# Patient Record
Sex: FEMALE | Race: ASIAN | NOT HISPANIC OR LATINO | Employment: UNEMPLOYED | URBAN - METROPOLITAN AREA
[De-identification: names, ages, dates, MRNs, and addresses within clinical notes are randomized per-mention and may not be internally consistent; named-entity substitution may affect disease eponyms.]

---

## 2024-07-08 ENCOUNTER — LAB (OUTPATIENT)
Dept: LAB | Facility: LAB | Age: 39
End: 2024-07-08
Payer: COMMERCIAL

## 2024-07-08 ENCOUNTER — APPOINTMENT (OUTPATIENT)
Dept: PRIMARY CARE | Facility: CLINIC | Age: 39
End: 2024-07-08
Payer: COMMERCIAL

## 2024-07-08 VITALS
SYSTOLIC BLOOD PRESSURE: 122 MMHG | OXYGEN SATURATION: 100 % | HEIGHT: 57 IN | HEART RATE: 61 BPM | WEIGHT: 120 LBS | BODY MASS INDEX: 25.89 KG/M2 | DIASTOLIC BLOOD PRESSURE: 70 MMHG

## 2024-07-08 DIAGNOSIS — Z13.228 SCREENING FOR METABOLIC DISORDER: ICD-10-CM

## 2024-07-08 DIAGNOSIS — R53.83 OTHER FATIGUE: ICD-10-CM

## 2024-07-08 DIAGNOSIS — H54.7 SEEING DIFFICULTY: ICD-10-CM

## 2024-07-08 DIAGNOSIS — R14.0 ABDOMINAL BLOATING: ICD-10-CM

## 2024-07-08 DIAGNOSIS — Z13.220 SCREENING FOR LIPID DISORDERS: ICD-10-CM

## 2024-07-08 DIAGNOSIS — K21.9 GASTROESOPHAGEAL REFLUX DISEASE, UNSPECIFIED WHETHER ESOPHAGITIS PRESENT: ICD-10-CM

## 2024-07-08 DIAGNOSIS — D50.9 IRON DEFICIENCY ANEMIA, UNSPECIFIED IRON DEFICIENCY ANEMIA TYPE: ICD-10-CM

## 2024-07-08 DIAGNOSIS — D50.9 IRON DEFICIENCY ANEMIA, UNSPECIFIED IRON DEFICIENCY ANEMIA TYPE: Primary | ICD-10-CM

## 2024-07-08 DIAGNOSIS — H91.92 HEARING LOSS OF LEFT EAR, UNSPECIFIED HEARING LOSS TYPE: ICD-10-CM

## 2024-07-08 LAB
25(OH)D3 SERPL-MCNC: 19 NG/ML (ref 30–100)
ALBUMIN SERPL BCP-MCNC: 4.1 G/DL (ref 3.4–5)
ALP SERPL-CCNC: 77 U/L (ref 33–110)
ALT SERPL W P-5'-P-CCNC: 38 U/L (ref 7–45)
ANION GAP SERPL CALC-SCNC: 9 MMOL/L (ref 10–20)
AST SERPL W P-5'-P-CCNC: 25 U/L (ref 9–39)
BILIRUB SERPL-MCNC: 0.3 MG/DL (ref 0–1.2)
BUN SERPL-MCNC: 8 MG/DL (ref 6–23)
CALCIUM SERPL-MCNC: 8.9 MG/DL (ref 8.6–10.3)
CHLORIDE SERPL-SCNC: 105 MMOL/L (ref 98–107)
CHOLEST SERPL-MCNC: 139 MG/DL (ref 0–199)
CHOLESTEROL/HDL RATIO: 3.5
CO2 SERPL-SCNC: 28 MMOL/L (ref 21–32)
CREAT SERPL-MCNC: 0.95 MG/DL (ref 0.5–1.05)
EGFRCR SERPLBLD CKD-EPI 2021: 79 ML/MIN/1.73M*2
ERYTHROCYTE [DISTWIDTH] IN BLOOD BY AUTOMATED COUNT: 12.7 % (ref 11.5–14.5)
GLUCOSE SERPL-MCNC: 115 MG/DL (ref 74–99)
HCT VFR BLD AUTO: 38.1 % (ref 36–46)
HDLC SERPL-MCNC: 39.4 MG/DL
HGB BLD-MCNC: 12.4 G/DL (ref 12–16)
IRON SATN MFR SERPL: 11 % (ref 25–45)
IRON SERPL-MCNC: 39 UG/DL (ref 35–150)
MCH RBC QN AUTO: 30.3 PG (ref 26–34)
MCHC RBC AUTO-ENTMCNC: 32.5 G/DL (ref 32–36)
MCV RBC AUTO: 93 FL (ref 80–100)
NON-HDL CHOLESTEROL: 100 MG/DL (ref 0–149)
NRBC BLD-RTO: 0 /100 WBCS (ref 0–0)
PLATELET # BLD AUTO: 246 X10*3/UL (ref 150–450)
POTASSIUM SERPL-SCNC: 4.3 MMOL/L (ref 3.5–5.3)
PROT SERPL-MCNC: 6.6 G/DL (ref 6.4–8.2)
RBC # BLD AUTO: 4.09 X10*6/UL (ref 4–5.2)
SODIUM SERPL-SCNC: 138 MMOL/L (ref 136–145)
TIBC SERPL-MCNC: 343 UG/DL (ref 240–445)
UIBC SERPL-MCNC: 304 UG/DL (ref 110–370)
VIT B12 SERPL-MCNC: 129 PG/ML (ref 211–911)
WBC # BLD AUTO: 8.3 X10*3/UL (ref 4.4–11.3)

## 2024-07-08 PROCEDURE — 83540 ASSAY OF IRON: CPT

## 2024-07-08 PROCEDURE — 82607 VITAMIN B-12: CPT

## 2024-07-08 PROCEDURE — 85027 COMPLETE CBC AUTOMATED: CPT

## 2024-07-08 PROCEDURE — 1036F TOBACCO NON-USER: CPT | Performed by: PHYSICIAN ASSISTANT

## 2024-07-08 PROCEDURE — 36415 COLL VENOUS BLD VENIPUNCTURE: CPT

## 2024-07-08 PROCEDURE — 99215 OFFICE O/P EST HI 40 MIN: CPT | Performed by: PHYSICIAN ASSISTANT

## 2024-07-08 PROCEDURE — 83550 IRON BINDING TEST: CPT

## 2024-07-08 PROCEDURE — 83718 ASSAY OF LIPOPROTEIN: CPT

## 2024-07-08 PROCEDURE — 82306 VITAMIN D 25 HYDROXY: CPT

## 2024-07-08 PROCEDURE — 80053 COMPREHEN METABOLIC PANEL: CPT

## 2024-07-08 PROCEDURE — 82465 ASSAY BLD/SERUM CHOLESTEROL: CPT

## 2024-07-08 NOTE — PROGRESS NOTES
"Subjective   Patient ID: Elizabeth Saxena is a 38 y.o. female who presents for Requesting Referrals.    HPI     Pt presents today with multiple concerns:     C/o left hearing loss. Patient saw ENT (Dr. Reyna) in 2023, CT ordered due to testing confirming a left sided hearing loss, but patient never had it done because of insurance issues and taking care of her mother at that time. Is requesting referral to go back to ENT for re-evaluation.     Also c/o abdominal bloating, acid reflux, abdominal pain. Patient was seen at [facility] ER on 4/5/2024. Rx Pantoprazole which did not help with symptoms States that her Sx are now happening more frequently (around 3-4 times per week). Spicy food always causes her to have heartburn. Does try to avoid spicy food. She does drink coffee - drinks 1-2 coffees per day. Does not use NSAIDs regularly. Was supposed to see GI as a follow up, but did not. Is now requesting referral. Also has diarrhea intermittently. Does not have regular constipation, but will occasionally have a hard time passing stool. Does have a BM daily.    Also c/o difficulty seeing up close. Is requesting referral to eye doctor. States that she saw an eye doctor at [facility] 4-5 years ago and they gave her glasses at that time. She does not still have these glasses, so is unsure if they still work. They got lost in her move from out of [facility] - just moved here around 1.5 years ago.     Also would like orders for routine labs. Is not fasting for labs. Does c/o fatigue. She was previously anemic with iron deficiency, had to get infusion because she could not tolerate oral iron due to constipation. Would like to have this checked as well. The last infusion was around 3-4 years ago.     Review of Systems   All other systems reviewed and are negative.      Objective   /70   Pulse 61   Ht 1.448 m (4' 9\")   Wt 54.4 kg (120 lb)   SpO2 100%   BMI 25.97 kg/m²     Physical Exam  Vitals reviewed.   Constitutional:       " General: She is awake.      Appearance: Normal appearance. She is well-developed.   HENT:      Head: Normocephalic and atraumatic.   Cardiovascular:      Rate and Rhythm: Normal rate.   Pulmonary:      Effort: Pulmonary effort is normal.   Musculoskeletal:      Cervical back: Full passive range of motion without pain.      Right lower leg: No edema.      Left lower leg: No edema.   Skin:     General: Skin is warm and dry.      Findings: No lesion or rash.   Neurological:      General: No focal deficit present.      Mental Status: She is alert and oriented to person, place, and time.      Cranial Nerves: No facial asymmetry.      Motor: Motor function is intact.      Gait: Gait is intact.   Psychiatric:         Attention and Perception: Attention normal.         Mood and Affect: Mood and affect normal.         Assessment/Plan   Diagnoses and all orders for this visit:  Iron deficiency anemia, unspecified iron deficiency anemia type  -     CBC; Future  -     Iron and TIBC; Future  Other fatigue  -     Vitamin D 25-Hydroxy,Total (for eval of Vitamin D levels); Future  -     Vitamin B12; Future  Screening for lipid disorders  -     Lipid Panel Non-Fasting; Future  Screening for metabolic disorder  -     Comprehensive metabolic panel; Future    Wants to stay in [facility] - will refer back to [facility] (Dr. Reyna), [facility] for optometry, and [facility] for gastroenterology.   In the meantime, avoid spicy/acidic foods and caffeine. Pt reports no improvement with PPI medication - benefit does not outweigh the risk of taking medication long-term if not helping.   Labs ordered as above - pt requesting screening labs, follow up on iron deficiency anemia, and also to check on her fatigue and abdominal pain  Will contact pt with results and referral information.

## 2024-07-10 ENCOUNTER — TELEPHONE (OUTPATIENT)
Dept: URGENT CARE | Facility: CLINIC | Age: 39
End: 2024-07-10
Payer: COMMERCIAL

## 2024-07-11 NOTE — TELEPHONE ENCOUNTER
I believe you may have already done this, but just sending this message as a reminder to fax pt's ophthalmology referral to [facility] and GI referral to [facility]. Office note is completed and referrals are ordered in pt's chart.

## 2024-07-24 ENCOUNTER — TELEPHONE (OUTPATIENT)
Dept: URGENT CARE | Facility: CLINIC | Age: 39
End: 2024-07-24
Payer: COMMERCIAL

## 2024-07-24 DIAGNOSIS — E55.9 VITAMIN D DEFICIENCY: ICD-10-CM

## 2024-07-24 DIAGNOSIS — E61.1 IRON DEFICIENCY: ICD-10-CM

## 2024-07-24 DIAGNOSIS — E53.8 VITAMIN B12 DEFICIENCY: Primary | ICD-10-CM

## 2024-07-24 RX ORDER — CHOLECALCIFEROL (VITAMIN D3) 25 MCG
1000 TABLET ORAL DAILY
Qty: 90 TABLET | Refills: 0 | Status: SHIPPED | OUTPATIENT
Start: 2024-07-24 | End: 2024-10-22

## 2024-07-24 RX ORDER — LANOLIN ALCOHOL/MO/W.PET/CERES
1000 CREAM (GRAM) TOPICAL DAILY
Qty: 90 TABLET | Refills: 0 | Status: SHIPPED | OUTPATIENT
Start: 2024-07-24 | End: 2024-10-22

## 2024-07-24 NOTE — TELEPHONE ENCOUNTER
Please call pt and let her know that her labs show that she has Vitamin B12, Vitamin D, and Iron deficiencies. I have called in supplements for her to begin taking for vitamin D and B12. She mentioned needing infusions for iron in the past because she could not tolerate oral iron due to constipation. If she would like, I can refer her to hematology to discuss this, or if she would like to try an iron supplement every other day and use a stool softener and drink plenty of water/eat plenty of fiber, that is an option, too. Just let me know.     We will want to recheck a CBC and B12 in 8 weeks and a vitamin D in 90 days. I have placed orders for these, she can walk in during lab hours to have this done.

## 2024-07-25 NOTE — TELEPHONE ENCOUNTER
Patient notified and expressed understanding. She would like a referral to hematology to discuss her iron infusions.

## 2024-07-27 NOTE — TELEPHONE ENCOUNTER
Referral placed, central scheduling should be in contact with pt to schedule. If she would like to see a hematologist outside of , she should provide us with the name and phone number of the office/provider.

## 2024-08-16 PROBLEM — H91.90 DECREASED HEARING: Status: ACTIVE | Noted: 2024-08-16

## 2024-08-16 PROBLEM — H91.92 HEARING LOSS OF LEFT EAR: Status: ACTIVE | Noted: 2024-08-16

## 2024-08-16 PROBLEM — M67.439 GANGLION OF WRIST: Status: ACTIVE | Noted: 2024-08-16

## 2024-08-16 PROBLEM — H90.0 CONDUCTIVE HEARING LOSS, BILATERAL: Status: ACTIVE | Noted: 2024-08-16

## 2024-08-16 RX ORDER — POLYETHYLENE GLYCOL 400 AND PROPYLENE GLYCOL 4; 3 MG/ML; MG/ML
SOLUTION/ DROPS OPHTHALMIC
COMMUNITY
Start: 2024-07-19

## 2024-08-16 RX ORDER — CARBOXYMETHYLCELLULOSE SODIUM 5 MG/ML
1 SOLUTION/ DROPS OPHTHALMIC 4 TIMES DAILY
COMMUNITY
Start: 2024-07-19

## 2024-08-16 RX ORDER — MINERAL OIL/PETROLATUM,WHITE 20%-80%
OINTMENT (GRAM) OPHTHALMIC (EYE)
COMMUNITY
Start: 2024-07-19

## 2024-09-03 ENCOUNTER — APPOINTMENT (OUTPATIENT)
Dept: OTOLARYNGOLOGY | Facility: CLINIC | Age: 39
End: 2024-09-03
Payer: COMMERCIAL

## 2024-09-03 ENCOUNTER — APPOINTMENT (OUTPATIENT)
Dept: AUDIOLOGY | Facility: CLINIC | Age: 39
End: 2024-09-03
Payer: COMMERCIAL

## 2024-09-03 VITALS — BODY MASS INDEX: 25.89 KG/M2 | WEIGHT: 120 LBS | HEIGHT: 57 IN

## 2024-09-03 DIAGNOSIS — H93.13 TINNITUS OF BOTH EARS: ICD-10-CM

## 2024-09-03 DIAGNOSIS — H90.A32 MIXED CONDUCTIVE AND SENSORINEURAL HEARING LOSS OF LEFT EAR WITH RESTRICTED HEARING OF RIGHT EAR: Primary | ICD-10-CM

## 2024-09-03 DIAGNOSIS — H90.A21 SENSORINEURAL HEARING LOSS (SNHL) OF RIGHT EAR WITH RESTRICTED HEARING OF LEFT EAR: ICD-10-CM

## 2024-09-03 DIAGNOSIS — H91.92 HEARING LOSS OF LEFT EAR, UNSPECIFIED HEARING LOSS TYPE: ICD-10-CM

## 2024-09-03 DIAGNOSIS — H92.02 OTALGIA, LEFT EAR: ICD-10-CM

## 2024-09-03 PROCEDURE — 1036F TOBACCO NON-USER: CPT | Performed by: STUDENT IN AN ORGANIZED HEALTH CARE EDUCATION/TRAINING PROGRAM

## 2024-09-03 PROCEDURE — 99214 OFFICE O/P EST MOD 30 MIN: CPT | Performed by: STUDENT IN AN ORGANIZED HEALTH CARE EDUCATION/TRAINING PROGRAM

## 2024-09-03 PROCEDURE — 92567 TYMPANOMETRY: CPT | Performed by: AUDIOLOGIST

## 2024-09-03 PROCEDURE — 3008F BODY MASS INDEX DOCD: CPT | Performed by: STUDENT IN AN ORGANIZED HEALTH CARE EDUCATION/TRAINING PROGRAM

## 2024-09-03 PROCEDURE — 92557 COMPREHENSIVE HEARING TEST: CPT | Performed by: AUDIOLOGIST

## 2024-09-03 NOTE — PROGRESS NOTES
COMPREHENSIVE AUDIOMETRIC EVALUATION      Name:  Elizabeth Saxena  :  1985  Age:  38 y.o.  Date of Evaluation:  24   Referring Provider:   Barney Verduzco MD       History:  Ms. Saxena was seen today for an evaluation of hearing.  Please recall, patient was previously seen by our clinic, at which time she presented with asymmetric mixed hearing loss.  Today, patient reported left otalgia and no improvement in hearing sensitivity. When asked, patient denied right otalgia.    See audiometric evaluation at end of this report or scanned under media tab    OTOSCOPY:       Right Ear: Clear canal       Left Ear: Clear canal    226 Hz TYMPANOMETRY:       Right Ear: Type A: normal peak pressure, compliance, and ear canal volume, consistent with middle ear function within normal limits       Left Ear: Type A: normal peak pressure, compliance, and ear canal volume, consistent with middle ear function within normal limits    AUDIOMETRIC EVALUATION (Inserts):       Right Ear: Borderline normal to mild, Sensorineural hearing loss                 Left Ear:  Moderately severe, Mixed hearing loss           NOTE: Hearing sensitivity within test retest reliability as compared to most recent audiometric evaluation 2023    Test technique:  Standard Audiometry  Reliability:   good    SPEECH RECOGNITION THRESHOLD:       Right Ear:  25 dBHL in good agreement with PTA       Left Ear:  60 dBHL in good agreement with PTA    WORD RECOGNITION:       Right Ear:  excellent (100%) at elevated presentation level       Left Ear:  excellent (100%) at elevated presentation level    RECOMMENDATIONS:  -Recommend patient return for repeated audiometric evaluation following any medical management for mixed left hearing loss  -Should no medical management be warranted, recommend patient return for hearing aid evaluation.    Donell Plaza, CCC-A     Appt: 9:40 - 10:00 AM

## 2024-09-03 NOTE — PROGRESS NOTES
Assessment  Left mixed hearing loss   Tinnitus     Plan   Audiogram performed today notable for left mixed hearing loss with significant AB gaps and mild sensorineural hearing loss at 250 Hz in her right ear.  Normal middle ear pressures.  The condition was reviewed and explained, possible left otosclerosis, CT IAC ordered to further evaluation.  She we will follow-up with otology for further evaluation.       History of Present Illness  9/3/24  The patient present for follow-up, did not proceed with CT IAC due to insurance/family issues.  Endorses stable hearing loss tinnitus, no other otologic complaints.  Recall 2/6/23  37-year-old female presenting for initial evaluation of hearing loss.  The patient reports developing hearing loss for years, states her hearing loss is more noticeable in her left ear. Occasionally has difficulty understanding speech in high background noise environments.  Also reports bilateral high-frequency tinnitus and occasional right aural fullness.  Denies vertigo, itching, discharge from ear or autophony.   Denies history of prior ear surgery, exposure to ototoxic drugs or agents.    No past medical history on file.    Past Surgical History:   Procedure Laterality Date    APPENDECTOMY           Current Outpatient Medications on File Prior to Visit   Medication Sig Dispense Refill    cholecalciferol (Vitamin D3) 25 MCG (1000 UT) tablet Take 1 tablet (1,000 Units) by mouth once daily. 90 tablet 0    cyanocobalamin (Vitamin B-12) 1,000 mcg tablet Take 1 tablet (1,000 mcg) by mouth once daily. 90 tablet 0    Refresh Tears 0.5 % ophthalmic solution Administer 1 drop into both eyes 4 times a day.      Retaine PM 80-20 % ophthalmic ointment APPLY 1/4 IN STRIP TO BOTH EYE(S) EVERY NIGHT AT BEDTIME      Systane Ultra 0.4-0.3 % drops ophthalmic drops        No current facility-administered medications on file prior to visit.        No Known Allergies     Review of Systems  A detailed 12 point ROS  was performed and is negative except as noted in the intake form, HPI and/or Past Medical History        Physical Exam   CONSTITUTIONAL: Well developed, well nourished.  VOICE: Normal voice quality  RESPIRATION: Breathing comfortably, no stridor.  CV: No clubbing/cyanosis/edema in hands.  EYES: EOM Intact, sclera normal.  NEURO: Alert and oriented times 3, Cranial nerves V,VII intact and symmetric bilaterally.  HEAD AND FACE: Symmetric facial features, no masses or lesions, sinuses nontender to palpation.  SALIVARY GLANDS: Parotid and submandibular glands normal bilaterally.  + EARS: Normal external ears  Right EAC patent, tympanic membrane intact  Left EAC patent, tympanic membrane intact  Malik: L>R  Rinne: Right: A>B; Left A<B.   NOSE: External nose midline, anterior rhinoscopy is normal with limited visualization to the anterior aspect of the interior turbinates. No lesions noted.  ORAL CAVITY/OROPHARYNX/LIPS: Normal mucous membranes, normal floor of mouth/tongue/OP, no masses or lesions are noted.  PHARYNGEAL WALLS AND NASOPHARYNX: No masses noted. Mucosa appears clean and moist  NECK/LYMPH: No LAD, no thyroid masses. Trachea palpably midline  SKIN: Neck skin is without injury  PSYCH: Alert and oriented with appropriate mood and affect     Results:   Audiogram performed today personally reviewed  Right: Mild sensorineural hearing loss at 250 Hz, otherwise normal hearing.  Speech discrimination score 100%.  Type a tympanogram.  Left: Moderate mixed hearing loss with significant AB gaps.  Speech discrimination score 100%.  Type a tympanogram.

## 2024-09-17 ENCOUNTER — HOSPITAL ENCOUNTER (OUTPATIENT)
Dept: RADIOLOGY | Facility: CLINIC | Age: 39
Discharge: HOME | End: 2024-09-17
Payer: COMMERCIAL

## 2024-09-17 DIAGNOSIS — H90.A32 MIXED CONDUCTIVE AND SENSORINEURAL HEARING LOSS OF LEFT EAR WITH RESTRICTED HEARING OF RIGHT EAR: ICD-10-CM

## 2024-09-17 PROCEDURE — 70480 CT ORBIT/EAR/FOSSA W/O DYE: CPT

## 2024-09-17 PROCEDURE — 70480 CT ORBIT/EAR/FOSSA W/O DYE: CPT | Performed by: RADIOLOGY

## 2024-10-01 ENCOUNTER — TELEPHONE (OUTPATIENT)
Dept: HEMATOLOGY/ONCOLOGY | Facility: HOSPITAL | Age: 39
End: 2024-10-01

## 2024-10-01 NOTE — TELEPHONE ENCOUNTER
Attempted to reach patient regarding her appointment that was scheduled today 10/01 @ 12:30 with Dr. Wagner. Patient no showed her appointment. Left message for patient with call back number to call at her earliest convenience.

## 2024-10-09 NOTE — TELEPHONE ENCOUNTER
Spoke with the patient she would like to talk to her PCP and find a provider closer to home. Patient declined to reschedule with us.

## 2024-10-23 ENCOUNTER — APPOINTMENT (OUTPATIENT)
Dept: OTOLARYNGOLOGY | Facility: CLINIC | Age: 39
End: 2024-10-23
Payer: COMMERCIAL

## 2024-10-23 ENCOUNTER — CLINICAL SUPPORT (OUTPATIENT)
Dept: AUDIOLOGY | Facility: CLINIC | Age: 39
End: 2024-10-23
Payer: COMMERCIAL

## 2024-10-23 VITALS — WEIGHT: 120 LBS | BODY MASS INDEX: 25.89 KG/M2 | HEIGHT: 57 IN

## 2024-10-23 DIAGNOSIS — H80.92 OTOSCLEROSIS OF LEFT EAR: Primary | ICD-10-CM

## 2024-10-23 DIAGNOSIS — H90.A32 MIXED CONDUCTIVE AND SENSORINEURAL HEARING LOSS OF LEFT EAR WITH RESTRICTED HEARING OF RIGHT EAR: ICD-10-CM

## 2024-10-23 DIAGNOSIS — H90.A21 SENSORINEURAL HEARING LOSS (SNHL) OF RIGHT EAR WITH RESTRICTED HEARING OF LEFT EAR: ICD-10-CM

## 2024-10-23 DIAGNOSIS — H90.A32 MIXED CONDUCTIVE AND SENSORINEURAL HEARING LOSS OF LEFT EAR WITH RESTRICTED HEARING OF RIGHT EAR: Primary | ICD-10-CM

## 2024-10-23 PROCEDURE — 99204 OFFICE O/P NEW MOD 45 MIN: CPT | Performed by: OTOLARYNGOLOGY

## 2024-10-23 PROCEDURE — 3008F BODY MASS INDEX DOCD: CPT | Performed by: OTOLARYNGOLOGY

## 2024-10-23 PROCEDURE — 92550 TYMPANOMETRY & REFLEX THRESH: CPT | Performed by: AUDIOLOGIST

## 2024-10-23 RX ORDER — CEFAZOLIN SODIUM 2 G/100ML
2 INJECTION, SOLUTION INTRAVENOUS ONCE
OUTPATIENT
Start: 2024-10-23 | End: 2024-10-23

## 2024-10-23 NOTE — PROGRESS NOTES
"        Reason for Consult:  Hearing Loss     Subjective   History Of Present Illness:  Elizabeth Saxena is a 38 y.o. female with left-sided hearing changes that has been present for the last 3 years. She is not able to hear when people whisper in her ear. She denies a family history of hearing loss. She denies tinnitus or dizziness.      Past Medical History:  She has no past medical history on file.    Surgical History:  She has a past surgical history that includes Appendectomy.     Social History:  She reports that she has never smoked. She has never been exposed to tobacco smoke. She has never used smokeless tobacco. She reports that she does not currently use alcohol. She reports that she does not currently use drugs.    Family History:  family history is not on file.     Medications:  Current Outpatient Medications   Medication Instructions    Refresh Tears 0.5 % ophthalmic solution 1 drop, 4 times daily    Retaine PM 80-20 % ophthalmic ointment APPLY 1/4 IN STRIP TO BOTH EYE(S) EVERY NIGHT AT BEDTIME    Systane Ultra 0.4-0.3 % drops ophthalmic drops       Allergies:  Patient has no known allergies.    Review of Systems:   A comprehensive 10-point review of systems was obtained including constitutional, neurological, HEENT, pulmonary, cardiovascular, genito-urinary, and other pertinent systems and was negative except as noted in the HPI.     Objective   Physical Exam:  Last Recorded Vitals: Height 1.448 m (4' 9\"), weight 54.4 kg (120 lb).    On physical exam, the patient is a well-nourished, well-developed patient, in no acute distress, able to communicate without assistance in English language. Head and face is atraumatic and normocephalic. Salivary glands are intact. Facial strength is symmetrical bilaterally.       On ear examination:  Right ear: The patient has an open and patent ear canal. The tympanic membrane is intact.  AC>BC  Left ear: The patient has an open and patent ear canal. The tympanic membrane " is intact.  BC>AC  The [place] is left    On vestibular exam, the patient has no spontaneous nystagmus, no headshake nystagmus, no head-thrust nystagmus, and no nystagmus on hyperventilation or Valsalva maneuvers. [place]-Hallpike maneuver is negative bilaterally.       On neuro exam, the patient is alert and oriented x3, cranial nerves are grossly intact, cerebellar exam is normal.      The rest of the exam, including anterior rhinoscopy, oropharyngeal exam, neck exam, and cardiovascular exam, were normal including no palpable lymphadenopathies, thyroid in the midline position, normal pulses, and normal chest excursion.       Reviewed Results:  Audiology Testing:  I personally reviewed the audiogram from 09/2024 that showed: normal hearing on the right with 100% discrimination. Moderate mixed hearing loss on the left with 40 dB of ABG and a 100% discrimination.       I reviewed acoustic reflexes from 10/2024 that shows absent reflexes bilaterally    Imaging:  I reviewed her CT IAC from 09/2024 that shows heterogeneity of the fissula ante fenestra bilaterally.        Procedure:  None    Assessment/Plan     1. Otosclerosis of left ear    2. 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 female with left-sided moderate mixed hearing loss with absent acoustic reflexes. This is consistent with otosclerosis. Her CT IAC showed otosclerosis bilaterally.    We discussed hearing aids vs surgery. She elected to undergo surgery.     The risks, indications, and complications of a left-sided endoscopic stapedectomy were discussed with the patient. She elected to proceed and will be scheduled for this in the near future.        Scribe Attestation  By signing my name below, IRaysa , Scribe attest that this documentation has been prepared under the direction and in the presence of Dale Javed MD.     ____________________________________________________  Dale Cordon MD  Professor and Chief   Otology/Neurotology/Lateral Skull-Base Surgery   [facility]

## 2024-10-23 NOTE — LETTER
October 26, 2024     Barney Verduzco MD  [address] [facility] [address]    Patient: Elizabeth Saxena   YOB: 1985   Date of Visit: 10/23/2024       Dear Dr. Barney Verduzco MD:    Thank you for referring Elizabeth Saxena to me for evaluation. Below are my notes for this consultation.  If you have questions, please do not hesitate to call me. I look forward to following your patient along with you.       Sincerely,     Dale Javed MD      CC: Oniel Robins, DO  ______________________________________________________________________________________            Reason for Consult:  Hearing Loss     Subjective  History Of Present Illness:  Elizabeth Saxena is a 38 y.o. female with left-sided hearing changes that has been present for the last 3 years. She is not able to hear when people whisper in her ear. She denies a family history of hearing loss. She denies tinnitus or dizziness.      Past Medical History:  She has no past medical history on file.    Surgical History:  She has a past surgical history that includes Appendectomy.     Social History:  She reports that she has never smoked. She has never been exposed to tobacco smoke. She has never used smokeless tobacco. She reports that she does not currently use alcohol. She reports that she does not currently use drugs.    Family History:  family history is not on file.     Medications:  Current Outpatient Medications   Medication Instructions   • Refresh Tears 0.5 % ophthalmic solution 1 drop, 4 times daily   • Retaine PM 80-20 % ophthalmic ointment APPLY 1/4 IN STRIP TO BOTH EYE(S) EVERY NIGHT AT BEDTIME   • Systane Ultra 0.4-0.3 % drops ophthalmic drops       Allergies:  Patient has no known allergies.    Review of Systems:   A comprehensive 10-point review of systems was obtained including constitutional, neurological, HEENT, pulmonary, cardiovascular, genito-urinary, and other pertinent systems and was negative except as noted in the  "HPI.     Objective  Physical Exam:  Last Recorded Vitals: Height 1.448 m (4' 9\"), weight 54.4 kg (120 lb).    On physical exam, the patient is a well-nourished, well-developed patient, in no acute distress, able to communicate without assistance in English language. Head and face is atraumatic and normocephalic. Salivary glands are intact. Facial strength is symmetrical bilaterally.       On ear examination:  Right ear: The patient has an open and patent ear canal. The tympanic membrane is intact.  AC>BC  Left ear: The patient has an open and patent ear canal. The tympanic membrane is intact.  BC>AC  The Pinto is left    On vestibular exam, the patient has no spontaneous nystagmus, no headshake nystagmus, no head-thrust nystagmus, and no nystagmus on hyperventilation or Valsalva maneuvers. [place]-Hallpike maneuver is negative bilaterally.       On neuro exam, the patient is alert and oriented x3, cranial nerves are grossly intact, cerebellar exam is normal.      The rest of the exam, including anterior rhinoscopy, oropharyngeal exam, neck exam, and cardiovascular exam, were normal including no palpable lymphadenopathies, thyroid in the midline position, normal pulses, and normal chest excursion.       Reviewed Results:  Audiology Testing:  I personally reviewed the audiogram from 09/2024 that showed: normal hearing on the right with 100% discrimination. Moderate mixed hearing loss on the left with 40 dB of ABG and a 100% discrimination.       I reviewed acoustic reflexes from 10/2024 that shows absent reflexes bilaterally    Imaging:  I reviewed her CT IAC from 09/2024 that shows heterogeneity of the fissula ante fenestra bilaterally.        Procedure:  None    Assessment/Plan    1. Otosclerosis of left ear    2. 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 female with left-sided moderate mixed hearing loss with absent " acoustic reflexes. This is consistent with otosclerosis. Her CT IAC showed otosclerosis bilaterally.    We discussed hearing aids vs surgery. She elected to undergo surgery.     The risks, indications, and complications of a left-sided endoscopic stapedectomy were discussed with the patient. She elected to proceed and will be scheduled for this in the near future.        Scribe Attestation  By signing my name below, I, Raysa Adelso , Scribe attest that this documentation has been prepared under the direction and in the presence of Dale Javed MD.    ____________________________________________________  Dale Cordon MD  Professor and Chief   Otology/Neurotology/Lateral Skull-Base Surgery   [facility]

## 2024-10-23 NOTE — PROGRESS NOTES
ACOUSTIC REFLEXES      Name:  Elizabeth Saxena  :  1985  Age:  38 y.o.  Date of Evaluation:  10/23/24   Referring Provider:   Dale Javed MD       See audiometric evaluation at end of this report or scanned under media tab    OTOSCOPY:       Right Ear: Clear canal       Left Ear: Clear canal    IPSILATERAL ACOUSTIC REFLEXES:       Right Ear:  Absent 500 - 2000 Hz       Left Ear:    Absent 500 - 2000 Hz    CONTRALATERAL REFLEXES:       Probe Right (Stimulus Left):  Absent 500 - 2000 Hz       Probe Left (Stimulus Right):    Absent 500 - 2000 Hz    NOTE: Absent reflexes in the right ear is a surprising finding given patient's hearing sensitivity.  Absent reflexes in the left ear are expected given conductive component and thresholds of >60 dB HL    RECOMMENDATIONS:  -Recommend patient return should concerns for changes in hearing sensitivity arise or as medically indicated.     Donell Plaza, CCC-A     Appt: 1:30 - 1:40 PM

## 2024-10-28 ENCOUNTER — ANESTHESIA EVENT (OUTPATIENT)
Dept: OPERATING ROOM | Facility: CLINIC | Age: 39
End: 2024-10-28
Payer: COMMERCIAL

## 2024-10-29 ENCOUNTER — ANESTHESIA (OUTPATIENT)
Dept: OPERATING ROOM | Facility: CLINIC | Age: 39
End: 2024-10-29
Payer: COMMERCIAL

## 2024-10-29 ENCOUNTER — HOSPITAL ENCOUNTER (OUTPATIENT)
Facility: CLINIC | Age: 39
Setting detail: OUTPATIENT SURGERY
Discharge: HOME | End: 2024-10-29
Attending: OTOLARYNGOLOGY | Admitting: OTOLARYNGOLOGY
Payer: COMMERCIAL

## 2024-10-29 VITALS
HEIGHT: 57 IN | RESPIRATION RATE: 16 BRPM | SYSTOLIC BLOOD PRESSURE: 127 MMHG | WEIGHT: 122.14 LBS | BODY MASS INDEX: 26.35 KG/M2 | DIASTOLIC BLOOD PRESSURE: 82 MMHG | OXYGEN SATURATION: 99 % | TEMPERATURE: 97.2 F | HEART RATE: 55 BPM

## 2024-10-29 DIAGNOSIS — H80.92 OTOSCLEROSIS OF LEFT EAR: Primary | ICD-10-CM

## 2024-10-29 DIAGNOSIS — H90.A32 MIXED CONDUCTIVE AND SENSORINEURAL HEARING LOSS OF LEFT EAR WITH RESTRICTED HEARING OF RIGHT EAR: ICD-10-CM

## 2024-10-29 LAB — PREGNANCY TEST URINE, POC: NEGATIVE

## 2024-10-29 PROCEDURE — 2500000002 HC RX 250 W HCPCS SELF ADMINISTERED DRUGS (ALT 637 FOR MEDICARE OP, ALT 636 FOR OP/ED): Mod: SE | Performed by: OTOLARYNGOLOGY

## 2024-10-29 PROCEDURE — 7100000002 HC RECOVERY ROOM TIME - EACH INCREMENTAL 1 MINUTE: Performed by: OTOLARYNGOLOGY

## 2024-10-29 PROCEDURE — 7100000001 HC RECOVERY ROOM TIME - INITIAL BASE CHARGE: Performed by: OTOLARYNGOLOGY

## 2024-10-29 PROCEDURE — 2500000004 HC RX 250 GENERAL PHARMACY W/ HCPCS (ALT 636 FOR OP/ED): Mod: SE

## 2024-10-29 PROCEDURE — 2500000005 HC RX 250 GENERAL PHARMACY W/O HCPCS: Mod: SE | Performed by: OTOLARYNGOLOGY

## 2024-10-29 PROCEDURE — 2780000003 HC OR 278 NO HCPCS: Performed by: OTOLARYNGOLOGY

## 2024-10-29 PROCEDURE — 3700000002 HC GENERAL ANESTHESIA TIME - EACH INCREMENTAL 1 MINUTE: Performed by: OTOLARYNGOLOGY

## 2024-10-29 PROCEDURE — L8613 OSSICULAR IMPLANT: HCPCS | Performed by: OTOLARYNGOLOGY

## 2024-10-29 PROCEDURE — 2500000001 HC RX 250 WO HCPCS SELF ADMINISTERED DRUGS (ALT 637 FOR MEDICARE OP): Mod: SE | Performed by: OTOLARYNGOLOGY

## 2024-10-29 PROCEDURE — 7100000010 HC PHASE TWO TIME - EACH INCREMENTAL 1 MINUTE: Performed by: OTOLARYNGOLOGY

## 2024-10-29 PROCEDURE — 69661 REVISE MIDDLE EAR BONE: CPT | Performed by: OTOLARYNGOLOGY

## 2024-10-29 PROCEDURE — A69661 PR STAPEDECTOMY,FOOTPLATE DRILL OUT: Performed by: ANESTHESIOLOGY

## 2024-10-29 PROCEDURE — 95867 NDL EMG CRANIAL NRV MUSC UNI: CPT | Performed by: OTOLARYNGOLOGY

## 2024-10-29 PROCEDURE — 81025 URINE PREGNANCY TEST: CPT | Performed by: OTOLARYNGOLOGY

## 2024-10-29 PROCEDURE — 2500000001 HC RX 250 WO HCPCS SELF ADMINISTERED DRUGS (ALT 637 FOR MEDICARE OP): Mod: SE | Performed by: ANESTHESIOLOGY

## 2024-10-29 PROCEDURE — 2500000001 HC RX 250 WO HCPCS SELF ADMINISTERED DRUGS (ALT 637 FOR MEDICARE OP): Mod: SE

## 2024-10-29 PROCEDURE — 2500000004 HC RX 250 GENERAL PHARMACY W/ HCPCS (ALT 636 FOR OP/ED): Mod: SE | Performed by: OTOLARYNGOLOGY

## 2024-10-29 PROCEDURE — 2720000007 HC OR 272 NO HCPCS: Performed by: OTOLARYNGOLOGY

## 2024-10-29 PROCEDURE — 2500000004 HC RX 250 GENERAL PHARMACY W/ HCPCS (ALT 636 FOR OP/ED): Mod: SE | Performed by: ANESTHESIOLOGY

## 2024-10-29 PROCEDURE — 3600000008 HC OR TIME - EACH INCREMENTAL 1 MINUTE - PROCEDURE LEVEL THREE: Performed by: OTOLARYNGOLOGY

## 2024-10-29 PROCEDURE — A69661 PR STAPEDECTOMY,FOOTPLATE DRILL OUT

## 2024-10-29 PROCEDURE — 3700000001 HC GENERAL ANESTHESIA TIME - INITIAL BASE CHARGE: Performed by: OTOLARYNGOLOGY

## 2024-10-29 PROCEDURE — 7100000009 HC PHASE TWO TIME - INITIAL BASE CHARGE: Performed by: OTOLARYNGOLOGY

## 2024-10-29 PROCEDURE — 3600000003 HC OR TIME - INITIAL BASE CHARGE - PROCEDURE LEVEL THREE: Performed by: OTOLARYNGOLOGY

## 2024-10-29 DEVICE — ECLIPSE PISTON 0.6MM DIA X 4.50MM L NITINOL/FLUOROPLASTIC
Type: IMPLANTABLE DEVICE | Site: EAR | Status: FUNCTIONAL
Brand: ECLIPSE PISTON

## 2024-10-29 RX ORDER — FENTANYL CITRATE 50 UG/ML
INJECTION, SOLUTION INTRAMUSCULAR; INTRAVENOUS AS NEEDED
Status: DISCONTINUED | OUTPATIENT
Start: 2024-10-29 | End: 2024-10-29

## 2024-10-29 RX ORDER — CIPROFLOXACIN AND DEXAMETHASONE 3; 1 MG/ML; MG/ML
5 SUSPENSION/ DROPS AURICULAR (OTIC) DAILY
Qty: 7.5 ML | Refills: 1 | Status: SHIPPED | OUTPATIENT
Start: 2024-10-29

## 2024-10-29 RX ORDER — CIPROFLOXACIN AND DEXAMETHASONE 3; 1 MG/ML; MG/ML
SUSPENSION/ DROPS AURICULAR (OTIC) AS NEEDED
Status: DISCONTINUED | OUTPATIENT
Start: 2024-10-29 | End: 2024-10-29 | Stop reason: HOSPADM

## 2024-10-29 RX ORDER — ONDANSETRON HYDROCHLORIDE 2 MG/ML
INJECTION, SOLUTION INTRAVENOUS AS NEEDED
Status: DISCONTINUED | OUTPATIENT
Start: 2024-10-29 | End: 2024-10-29

## 2024-10-29 RX ORDER — LABETALOL HYDROCHLORIDE 5 MG/ML
5 INJECTION, SOLUTION INTRAVENOUS ONCE AS NEEDED
Status: DISCONTINUED | OUTPATIENT
Start: 2024-10-29 | End: 2024-10-29 | Stop reason: HOSPADM

## 2024-10-29 RX ORDER — CEFAZOLIN 1 G/1
INJECTION, POWDER, FOR SOLUTION INTRAVENOUS AS NEEDED
Status: DISCONTINUED | OUTPATIENT
Start: 2024-10-29 | End: 2024-10-29

## 2024-10-29 RX ORDER — MUPIROCIN 20 MG/G
OINTMENT TOPICAL AS NEEDED
Status: DISCONTINUED | OUTPATIENT
Start: 2024-10-29 | End: 2024-10-29 | Stop reason: HOSPADM

## 2024-10-29 RX ORDER — ASPIRIN 81 MG
100 TABLET, DELAYED RELEASE (ENTERIC COATED) ORAL 2 TIMES DAILY
Start: 2024-10-29

## 2024-10-29 RX ORDER — SODIUM CHLORIDE, SODIUM LACTATE, POTASSIUM CHLORIDE, CALCIUM CHLORIDE 600; 310; 30; 20 MG/100ML; MG/100ML; MG/100ML; MG/100ML
INJECTION, SOLUTION INTRAVENOUS CONTINUOUS PRN
Status: DISCONTINUED | OUTPATIENT
Start: 2024-10-29 | End: 2024-10-29

## 2024-10-29 RX ORDER — OXYCODONE HYDROCHLORIDE 5 MG/1
5 TABLET ORAL EVERY 4 HOURS PRN
Status: DISCONTINUED | OUTPATIENT
Start: 2024-10-29 | End: 2024-10-29 | Stop reason: HOSPADM

## 2024-10-29 RX ORDER — METHYLPREDNISOLONE 4 MG/1
TABLET ORAL
Qty: 21 TABLET | Refills: 0 | Status: SHIPPED | OUTPATIENT
Start: 2024-10-29 | End: 2024-11-05

## 2024-10-29 RX ORDER — LIDOCAINE HYDROCHLORIDE AND EPINEPHRINE 20; 10 MG/ML; UG/ML
INJECTION, SOLUTION INFILTRATION; PERINEURAL AS NEEDED
Status: DISCONTINUED | OUTPATIENT
Start: 2024-10-29 | End: 2024-10-29 | Stop reason: HOSPADM

## 2024-10-29 RX ORDER — PHENYLEPHRINE HCL IN 0.9% NACL 1 MG/10 ML
SYRINGE (ML) INTRAVENOUS AS NEEDED
Status: DISCONTINUED | OUTPATIENT
Start: 2024-10-29 | End: 2024-10-29

## 2024-10-29 RX ORDER — MIDAZOLAM HYDROCHLORIDE 1 MG/ML
INJECTION, SOLUTION INTRAMUSCULAR; INTRAVENOUS AS NEEDED
Status: DISCONTINUED | OUTPATIENT
Start: 2024-10-29 | End: 2024-10-29

## 2024-10-29 RX ORDER — LIDOCAINE HYDROCHLORIDE 10 MG/ML
0.1 INJECTION, SOLUTION EPIDURAL; INFILTRATION; INTRACAUDAL; PERINEURAL ONCE
Status: DISCONTINUED | OUTPATIENT
Start: 2024-10-29 | End: 2024-10-29 | Stop reason: HOSPADM

## 2024-10-29 RX ORDER — LIDOCAINE HYDROCHLORIDE 40 MG/ML
SOLUTION TOPICAL AS NEEDED
Status: DISCONTINUED | OUTPATIENT
Start: 2024-10-29 | End: 2024-10-29

## 2024-10-29 RX ORDER — ONDANSETRON HYDROCHLORIDE 2 MG/ML
4 INJECTION, SOLUTION INTRAVENOUS ONCE AS NEEDED
Status: DISCONTINUED | OUTPATIENT
Start: 2024-10-29 | End: 2024-10-29 | Stop reason: HOSPADM

## 2024-10-29 RX ORDER — TRAMADOL HYDROCHLORIDE 50 MG/1
50 TABLET ORAL EVERY 4 HOURS PRN
Qty: 12 TABLET | Refills: 0 | Status: SHIPPED | OUTPATIENT
Start: 2024-10-29

## 2024-10-29 RX ORDER — CEFAZOLIN SODIUM 2 G/50ML
2 SOLUTION INTRAVENOUS ONCE
Status: DISCONTINUED | OUTPATIENT
Start: 2024-10-29 | End: 2024-10-29 | Stop reason: HOSPADM

## 2024-10-29 RX ORDER — ACETAMINOPHEN 325 MG/1
650 TABLET ORAL EVERY 6 HOURS PRN
Start: 2024-10-29

## 2024-10-29 RX ORDER — SODIUM CHLORIDE 0.9 G/100ML
IRRIGANT IRRIGATION AS NEEDED
Status: DISCONTINUED | OUTPATIENT
Start: 2024-10-29 | End: 2024-10-29 | Stop reason: HOSPADM

## 2024-10-29 RX ORDER — ONDANSETRON 4 MG/1
4 TABLET, FILM COATED ORAL EVERY 8 HOURS PRN
Qty: 20 TABLET | Refills: 0 | Status: SHIPPED | OUTPATIENT
Start: 2024-10-29

## 2024-10-29 RX ORDER — ACETAMINOPHEN 325 MG/1
650 TABLET ORAL EVERY 4 HOURS PRN
Status: DISCONTINUED | OUTPATIENT
Start: 2024-10-29 | End: 2024-10-29 | Stop reason: HOSPADM

## 2024-10-29 RX ORDER — EPINEPHRINE 1 MG/ML
INJECTION, SOLUTION, CONCENTRATE INTRAVENOUS AS NEEDED
Status: DISCONTINUED | OUTPATIENT
Start: 2024-10-29 | End: 2024-10-29 | Stop reason: HOSPADM

## 2024-10-29 RX ORDER — IBUPROFEN 600 MG/1
600 TABLET ORAL EVERY 6 HOURS PRN
Start: 2024-10-29

## 2024-10-29 RX ORDER — CEPHALEXIN 500 MG/1
500 CAPSULE ORAL 3 TIMES DAILY
Qty: 15 CAPSULE | Refills: 0 | Status: SHIPPED | OUTPATIENT
Start: 2024-10-29 | End: 2024-11-03

## 2024-10-29 RX ORDER — FENTANYL CITRATE 50 UG/ML
25 INJECTION, SOLUTION INTRAMUSCULAR; INTRAVENOUS EVERY 5 MIN PRN
Status: DISCONTINUED | OUTPATIENT
Start: 2024-10-29 | End: 2024-10-29 | Stop reason: HOSPADM

## 2024-10-29 RX ORDER — FENTANYL CITRATE 50 UG/ML
50 INJECTION, SOLUTION INTRAMUSCULAR; INTRAVENOUS EVERY 5 MIN PRN
Status: DISCONTINUED | OUTPATIENT
Start: 2024-10-29 | End: 2024-10-29 | Stop reason: HOSPADM

## 2024-10-29 RX ORDER — PROPOFOL 10 MG/ML
INJECTION, EMULSION INTRAVENOUS AS NEEDED
Status: DISCONTINUED | OUTPATIENT
Start: 2024-10-29 | End: 2024-10-29

## 2024-10-29 SDOH — HEALTH STABILITY: MENTAL HEALTH: CURRENT SMOKER: 0

## 2024-10-29 ASSESSMENT — COLUMBIA-SUICIDE SEVERITY RATING SCALE - C-SSRS
1. IN THE PAST MONTH, HAVE YOU WISHED YOU WERE DEAD OR WISHED YOU COULD GO TO SLEEP AND NOT WAKE UP?: NO
2. HAVE YOU ACTUALLY HAD ANY THOUGHTS OF KILLING YOURSELF?: NO
6. HAVE YOU EVER DONE ANYTHING, STARTED TO DO ANYTHING, OR PREPARED TO DO ANYTHING TO END YOUR LIFE?: NO

## 2024-10-29 ASSESSMENT — PAIN SCALES - GENERAL
PAINLEVEL_OUTOF10: 7
PAIN_LEVEL: 1
PAINLEVEL_OUTOF10: 5 - MODERATE PAIN
PAINLEVEL_OUTOF10: 0 - NO PAIN
PAINLEVEL_OUTOF10: 5 - MODERATE PAIN

## 2024-10-29 ASSESSMENT — PAIN - FUNCTIONAL ASSESSMENT
PAIN_FUNCTIONAL_ASSESSMENT: 0-10
PAIN_FUNCTIONAL_ASSESSMENT: UNABLE TO SELF-REPORT
PAIN_FUNCTIONAL_ASSESSMENT: 0-10

## 2024-10-29 ASSESSMENT — PAIN DESCRIPTION - ORIENTATION: ORIENTATION: LEFT

## 2024-10-29 ASSESSMENT — PAIN DESCRIPTION - LOCATION: LOCATION: EAR

## 2024-10-31 ENCOUNTER — APPOINTMENT (OUTPATIENT)
Dept: GASTROENTEROLOGY | Facility: CLINIC | Age: 39
End: 2024-10-31
Payer: COMMERCIAL

## 2024-11-26 ENCOUNTER — APPOINTMENT (OUTPATIENT)
Dept: OTOLARYNGOLOGY | Facility: CLINIC | Age: 39
End: 2024-11-26
Payer: COMMERCIAL

## 2024-11-26 VITALS — HEIGHT: 57 IN | BODY MASS INDEX: 25.89 KG/M2 | WEIGHT: 120 LBS

## 2024-11-26 DIAGNOSIS — H80.92 OTOSCLEROSIS OF LEFT EAR: ICD-10-CM

## 2024-11-26 DIAGNOSIS — H90.A32 MIXED CONDUCTIVE AND SENSORINEURAL HEARING LOSS OF LEFT EAR WITH RESTRICTED HEARING OF RIGHT EAR: Primary | ICD-10-CM

## 2024-11-26 DIAGNOSIS — H90.A21 SENSORINEURAL HEARING LOSS (SNHL) OF RIGHT EAR WITH RESTRICTED HEARING OF LEFT EAR: ICD-10-CM

## 2024-11-26 DIAGNOSIS — H93.13 TINNITUS OF BOTH EARS: ICD-10-CM

## 2024-11-26 PROCEDURE — 3008F BODY MASS INDEX DOCD: CPT | Performed by: OTOLARYNGOLOGY

## 2024-11-26 PROCEDURE — 99024 POSTOP FOLLOW-UP VISIT: CPT | Performed by: OTOLARYNGOLOGY

## 2024-11-26 NOTE — LETTER
November 28, 2024     Barney Verduzco MD  [address] [facility] [address]    Patient: Elizabeth Saxena   YOB: 1985   Date of Visit: 11/26/2024       Dear Dr. Barney Verduzco MD:    Thank you for referring Elizabeth Saxena to me for evaluation. Below are my notes for this consultation.  If you have questions, please do not hesitate to call me. I look forward to following your patient along with you.       Sincerely,     Dale Javed MD      CC: Oniel Robins, DO  ______________________________________________________________________________________            Reason for Consult:  Post-op     Subjective  History Of Present Illness:  Elizabeth Saxena is a 39 y.o. female with left-sided moderate mixed hearing loss with absent acoustic reflexes. This is consistent with otosclerosis. Her CT IAC showed otosclerosis bilaterally.    She is s/p left-sided endoscopic stapedectomy on 11/2024. During surgery we encountered:    1. Stapes fixed. Fixed footplate. Footplate was thick requiring S2 mini drillout to complete the footplate fenestra.  2. Facial nerve: Not dehiscent in tympanic segment. Normal location  3. Chorda intact.  4. Producteev Eclipse Piston 4.5mm x 0.6mm placed.  5. CO2 laser used at 3-6 malone      Since surgery she has been doing well.  She has not had any dizziness.  Her taste has recovered.  Her hearing is improving.     Past Medical History:  She has a past medical history of Anemia.    Surgical History:  She has a past surgical history that includes Appendectomy.     Social History:  She reports that she has never smoked. She has never been exposed to tobacco smoke. She has never used smokeless tobacco. She reports that she does not currently use alcohol. She reports that she does not currently use drugs.    Family History:  family history is not on file.     Medications:  Current Outpatient Medications   Medication Instructions   • acetaminophen (TYLENOL) 650 mg, oral,  "Every 6 hours PRN   • ciprofloxacin-dexamethasone (Ciprodex) otic suspension 5 drops, Left Ear, Daily, Start drops to the ear 1 week before your follow up with Dr. Cordon   • docusate sodium (COLACE) 100 mg, oral, 2 times daily, Take while using narcotics for pain control   • ibuprofen 600 mg, oral, Every 6 hours PRN   • ondansetron (ZOFRAN) 4 mg, oral, Every 8 hours PRN   • Refresh Tears 0.5 % ophthalmic solution 1 drop, 4 times daily   • Retaine PM 80-20 % ophthalmic ointment APPLY 1/4 IN STRIP TO BOTH EYE(S) EVERY NIGHT AT BEDTIME   • Systane Ultra 0.4-0.3 % drops ophthalmic drops    • traMADol (ULTRAM) 50 mg, oral, Every 4 hours PRN      Allergies:  Patient has no known allergies.    Review of Systems:   A comprehensive 10-point review of systems was obtained including constitutional, neurological, HEENT, pulmonary, cardiovascular, genito-urinary, and other pertinent systems and was negative except as noted in the HPI.     Objective  Physical Exam:  Last Recorded Vitals: Height 1.448 m (4' 9\"), weight 54.4 kg (120 lb).    On physical exam, the patient is a well-nourished, well-developed patient, in no acute distress, able to communicate without assistance in English language. Head and face is atraumatic and normocephalic. Salivary glands are intact. Facial strength is symmetrical bilaterally.       On ear examination:  Right ear: The patient has an open and patent ear canal. The tympanic membrane is intact.  AC>BC  Left ear: The packing was removed.  The tympanic membrane is intact.  AC>BC  The Pinto is Midline    On vestibular exam, the patient has no spontaneous nystagmus, no headshake nystagmus, no head-thrust nystagmus, and no nystagmus on hyperventilation or Valsalva maneuvers. [place]-Hallpike maneuver is negative bilaterally.       On neuro exam, the patient is alert and oriented x3, cranial nerves are grossly intact, cerebellar exam is normal.      The rest of the exam, including anterior rhinoscopy, " oropharyngeal exam, neck exam, and cardiovascular exam, were normal including no palpable lymphadenopathies, thyroid in the midline position, normal pulses, and normal chest excursion.       Reviewed Results:  Audiology Testing:  I personally reviewed the audiogram from 09/2024 that showed: normal hearing on the right with 100% discrimination. Moderate mixed hearing loss on the left with 40 dB of ABG and a 100% discrimination.       I reviewed acoustic reflexes from 10/2024 that shows absent reflexes bilaterally    Imaging:  I reviewed her CT IAC from 09/2024 that shows heterogeneity of the fissula ante fenestra bilaterally.        Procedure:  None    Assessment/Plan    1. Mixed conductive and sensorineural hearing loss of left ear with restricted hearing of right ear    2. Otosclerosis of left ear    3. Sensorineural hearing loss (SNHL) of right ear with restricted hearing of left ear    4. Tinnitus of both ears        In summary, Elizabeth Saxena is a 39 y.o. female left-sided moderate mixed hearing loss with absent acoustic reflexes. This is consistent with otosclerosis. Her CT IAC showed otosclerosis bilaterally.    She is s/p left-sided endoscopic stapedectomy on 11/2024. Since surgery she has been doing well.  She has not had any dizziness.  Her taste has recovered.  Her hearing is improving based on tuning fork exam.    - Return to regular activities.  - Follow up in 3 months with an Audiogram.         Scribe Attestation  By signing my name below, I, Raysa Odomwaylon , Scribe attest that this documentation has been prepared under the direction and in the presence of Dale Javed MD.    ____________________________________________________  Dale Cordon MD  Professor and Chief   Otology/Neurotology/Lateral Skull-Base Surgery   [facility]

## 2024-11-26 NOTE — PROGRESS NOTES
Reason for Consult:  Post-op     Subjective   History Of Present Illness:  Elizabeth Saxena is a 39 y.o. female with left-sided moderate mixed hearing loss with absent acoustic reflexes. This is consistent with otosclerosis. Her CT IAC showed otosclerosis bilaterally.    She is s/p left-sided endoscopic stapedectomy on 11/2024. During surgery we encountered:    1. Stapes fixed. Fixed footplate. Footplate was thick requiring S2 mini drillout to complete the footplate fenestra.  2. Facial nerve: Not dehiscent in tympanic segment. Normal location  3. Chorda intact.  4. gantto Eclipse Piston 4.5mm x 0.6mm placed.  5. CO2 laser used at 3-6 malone      Since surgery she has been doing well.  She has not had any dizziness.  Her taste has recovered.  Her hearing is improving.     Past Medical History:  She has a past medical history of Anemia.    Surgical History:  She has a past surgical history that includes Appendectomy.     Social History:  She reports that she has never smoked. She has never been exposed to tobacco smoke. She has never used smokeless tobacco. She reports that she does not currently use alcohol. She reports that she does not currently use drugs.    Family History:  family history is not on file.     Medications:  Current Outpatient Medications   Medication Instructions    acetaminophen (TYLENOL) 650 mg, oral, Every 6 hours PRN    ciprofloxacin-dexamethasone (Ciprodex) otic suspension 5 drops, Left Ear, Daily, Start drops to the ear 1 week before your follow up with Dr. Cordon    docusate sodium (COLACE) 100 mg, oral, 2 times daily, Take while using narcotics for pain control    ibuprofen 600 mg, oral, Every 6 hours PRN    ondansetron (ZOFRAN) 4 mg, oral, Every 8 hours PRN    Refresh Tears 0.5 % ophthalmic solution 1 drop, 4 times daily    Retaine PM 80-20 % ophthalmic ointment APPLY 1/4 IN STRIP TO BOTH EYE(S) EVERY NIGHT AT BEDTIME    Systane Ultra 0.4-0.3 % drops ophthalmic drops      "traMADol (ULTRAM) 50 mg, oral, Every 4 hours PRN      Allergies:  Patient has no known allergies.    Review of Systems:   A comprehensive 10-point review of systems was obtained including constitutional, neurological, HEENT, pulmonary, cardiovascular, genito-urinary, and other pertinent systems and was negative except as noted in the HPI.     Objective   Physical Exam:  Last Recorded Vitals: Height 1.448 m (4' 9\"), weight 54.4 kg (120 lb).    On physical exam, the patient is a well-nourished, well-developed patient, in no acute distress, able to communicate without assistance in English language. Head and face is atraumatic and normocephalic. Salivary glands are intact. Facial strength is symmetrical bilaterally.       On ear examination:  Right ear: The patient has an open and patent ear canal. The tympanic membrane is intact.  AC>BC  Left ear: The packing was removed.  The tympanic membrane is intact.  AC>BC  The P[place] is Midline    On vestibular exam, the patient has no spontaneous nystagmus, no headshake nystagmus, no head-thrust nystagmus, and no nystagmus on hyperventilation or Valsalva maneuvers. [place]-Hallpike maneuver is negative bilaterally.       On neuro exam, the patient is alert and oriented x3, cranial nerves are grossly intact, cerebellar exam is normal.      The rest of the exam, including anterior rhinoscopy, oropharyngeal exam, neck exam, and cardiovascular exam, were normal including no palpable lymphadenopathies, thyroid in the midline position, normal pulses, and normal chest excursion.       Reviewed Results:  Audiology Testing:  I personally reviewed the audiogram from 09/2024 that showed: normal hearing on the right with 100% discrimination. Moderate mixed hearing loss on the left with 40 dB of ABG and a 100% discrimination.       I reviewed acoustic reflexes from 10/2024 that shows absent reflexes bilaterally    Imaging:  I reviewed her CT IAC from 09/2024 that shows heterogeneity of the " fissula ante fenestra bilaterally.        Procedure:  None    Assessment/Plan     1. Mixed conductive and sensorineural hearing loss of left ear with restricted hearing of right ear    2. Otosclerosis of left ear    3. Sensorineural hearing loss (SNHL) of right ear with restricted hearing of left ear    4. Tinnitus of both ears        In summary, Elizabeth Saxena is a 39 y.o. female left-sided moderate mixed hearing loss with absent acoustic reflexes. This is consistent with otosclerosis. Her CT IAC showed otosclerosis bilaterally.    She is s/p left-sided endoscopic stapedectomy on 11/2024. Since surgery she has been doing well.  She has not had any dizziness.  Her taste has recovered.  Her hearing is improving based on tuning fork exam.    - Return to regular activities.  - Follow up in 3 months with an Audiogram.         Scribe Attestation  By signing my name below, I Raysa Adelso , Scribe attest that this documentation has been prepared under the direction and in the presence of Dale Javed MD.    ____________________________________________________  Dale Cordon MD  Professor and Chief   Otology/Neurotology/Lateral Skull-Base Surgery   [facility]

## 2025-02-26 ENCOUNTER — APPOINTMENT (OUTPATIENT)
Dept: OTOLARYNGOLOGY | Facility: CLINIC | Age: 40
End: 2025-02-26
Payer: COMMERCIAL

## (undated) DEVICE — SYRINGE, MONOJECT, LUER LOCK, 3 CC, LF

## (undated) DEVICE — SYRINGE, 1 CC, LUER LOCK

## (undated) DEVICE — NEEDLE, HYPODERMIC, MONOJECT, TRI-BEVELED, ANTI-CORING, 27 G X 1.25 IN, LUER LOCK HUB, YELLOW

## (undated) DEVICE — BANDAGE, ADHESIVE, STRIP, FLEXIBLE 3/4 X 3 IN, LF

## (undated) DEVICE — Device

## (undated) DEVICE — GOWN, SURGICAL, REINFORCED, XLARGE, X-LONG, STERILE

## (undated) DEVICE — CONTAINER, SPECIMEN, 4 OZ, OR PEEL PACK, STERILE

## (undated) DEVICE — GLOVE, SURGICAL, PROTEXIS PI , 7.5, PF, LF

## (undated) DEVICE — CATHETER, IV, ANGIOCATH, 14 G X 1.16 IN, FEP POLYMER

## (undated) DEVICE — BLADE, TYPANOPLASTY, 60 DEG ANGLED, 2.5MM

## (undated) DEVICE — DRAPE, SURGICAL, OTOLOGY GLASSCOCK

## (undated) DEVICE — DRAPE, TOWEL, STERI DRAPE, 17 X 11 IN, PLASTIC, STERILE

## (undated) DEVICE — 12MM MEDTRONIC PAIRED SUBDERMAL ELECTRODE, 2-CHANNEL, 12.0MM***DUPLICATE, PLEASE TRANSITION TO CAT #8227410

## (undated) DEVICE — DRAPE, INSTRUMENT, W/POUCH, STERI DRAPE, 7 X 11 IN, DISPOSABLE, STERILE

## (undated) DEVICE — DRAPE, SHEET, W/APERTURE, SMALL, 16 X 16 IN, DISPOSABLE, STERILE

## (undated) DEVICE — BUR, 0.7MM DIAMOND, ROUND, S2 MINI

## (undated) DEVICE — CLEANER, WIPE, INSTRUMENT, 3.25 X 3.25 IN

## (undated) DEVICE — TOWEL, SURGICAL, NEURO, O/R, 16 X 26, BLUE, STERILE

## (undated) DEVICE — HOLSTER, JET SAFETY

## (undated) DEVICE — TUBING, SUCTION, OTOMED

## (undated) DEVICE — SUTURE, PLAIN, 5-0, 18 IN, PC1, YELLOW

## (undated) DEVICE — SIZERS, OTOLOGIC MOLDED, POLYPROPYLENE

## (undated) DEVICE — DRAPE, MICROSCOPE, W/REMOVABLE LENS

## (undated) DEVICE — STOCKINETTE, IMPERVIOUS, 12 X 48 IN, LF, STERILE